# Patient Record
Sex: FEMALE | Race: WHITE | Employment: OTHER | URBAN - METROPOLITAN AREA
[De-identification: names, ages, dates, MRNs, and addresses within clinical notes are randomized per-mention and may not be internally consistent; named-entity substitution may affect disease eponyms.]

---

## 2018-04-04 ENCOUNTER — HOSPITAL ENCOUNTER (OUTPATIENT)
Dept: GENERAL RADIOLOGY | Age: 68
Discharge: HOME | End: 2018-04-06
Payer: MEDICARE

## 2018-04-04 ENCOUNTER — HOSPITAL ENCOUNTER (OUTPATIENT)
Age: 68
Discharge: HOME | End: 2018-04-06
Payer: MEDICARE

## 2018-04-04 DIAGNOSIS — C50.511 MALIGNANT NEOPLASM OF LOWER-OUTER QUADRANT OF RIGHT FEMALE BREAST, UNSPECIFIED ESTROGEN RECEPTOR STATUS (HCC): ICD-10-CM

## 2018-04-04 PROCEDURE — 73130 X-RAY EXAM OF HAND: CPT

## 2018-12-12 ENCOUNTER — HOSPITAL ENCOUNTER (OUTPATIENT)
Dept: RADIATION ONCOLOGY | Age: 68
Discharge: HOME | End: 2018-12-12
Payer: MEDICARE

## 2018-12-13 ENCOUNTER — HOSPITAL ENCOUNTER (OUTPATIENT)
Dept: RADIATION ONCOLOGY | Age: 68
Discharge: HOME | End: 2018-12-13
Payer: MEDICARE

## 2018-12-13 VITALS
HEART RATE: 87 BPM | DIASTOLIC BLOOD PRESSURE: 58 MMHG | OXYGEN SATURATION: 90 % | SYSTOLIC BLOOD PRESSURE: 92 MMHG | WEIGHT: 100.4 LBS

## 2018-12-13 DIAGNOSIS — C79.51 BONE METASTASIS (HCC): ICD-10-CM

## 2018-12-13 DIAGNOSIS — Z17.0 MALIGNANT NEOPLASM OF CENTRAL PORTION OF RIGHT BREAST IN FEMALE, ESTROGEN RECEPTOR POSITIVE (HCC): Primary | ICD-10-CM

## 2018-12-13 DIAGNOSIS — C50.111 MALIGNANT NEOPLASM OF CENTRAL PORTION OF RIGHT BREAST IN FEMALE, ESTROGEN RECEPTOR POSITIVE (HCC): Primary | ICD-10-CM

## 2018-12-13 PROCEDURE — 99205 OFFICE O/P NEW HI 60 MIN: CPT

## 2018-12-13 PROCEDURE — 99203 OFFICE O/P NEW LOW 30 MIN: CPT | Performed by: RADIOLOGY

## 2018-12-13 RX ORDER — ZOLEDRONIC ACID 4 MG/5ML
4 INJECTION INTRAVENOUS ONCE
COMMUNITY

## 2018-12-13 RX ORDER — HYDROCODONE BITARTRATE AND ACETAMINOPHEN 10; 325 MG/1; MG/1
1 TABLET ORAL EVERY 6 HOURS PRN
COMMUNITY
End: 2022-10-12

## 2018-12-13 ASSESSMENT — PAIN DESCRIPTION - LOCATION: LOCATION: BUTTOCKS;FOOT;LEG

## 2018-12-13 ASSESSMENT — PAIN DESCRIPTION - ORIENTATION: ORIENTATION: LEFT

## 2018-12-13 NOTE — PROGRESS NOTES
Oncology    Brigida:  [phone]              FAX: [address] [place] [address] Road:      [phone]             FAX:  [phone]      CC:  Joanie Najera MD , Vivek Washington MD  [address] [place] [address]

## 2018-12-13 NOTE — PROGRESS NOTES
Referring Physician:Dr Andres Ham MD :    Vitals:    12/13/18 1517   BP: (!) 92/58   Pulse: 87   SpO2: 90%    : Wt Readings from Last 1 Encounters:   12/13/18 100 lb 6.4 oz (45.5 kg)        There is no height or weight on file to calculate BMI. No chief complaint on file. Cancer Staging  No matching staging information was found for the patient. Prior Radiation Therapy? Yes   If yes, site treated:right breast            Facility:[facility]                        Date: 2/21/2015    Concurrent Chemo/radiation? Yes   If yes, start date: 2/21/2015    Prior Chemotherapy? Yes   If yes, site treated:Right Breast/Bone Mets  Facility[facility]                             Date:2/15/2015 -patient currently on Ibrance/Faslodex/Zometa  Prior Hormonal Therapy? No   If yes, site treated:   Facility:                             Date:    Head and Neck Cancer? No   If yes, please remind physician to place swallow study order and speech therapy order. Current Outpatient Prescriptions   Medication Sig Dispense Refill    HYDROcodone-acetaminophen (NORCO)  MG per tablet Take 1 tablet by mouth every 6 hours as needed for Pain. Ozzy Estrada Palbociclib (IBRANCE PO) Take by mouth      Fulvestrant (FASLODEX IM) Inject into the muscle      zoledronic acid (ZOMETA) 4 MG/5ML injection Infuse 4 mg intravenously once       No current facility-administered medications for this encounter. Past Medical History:   Diagnosis Date    Cancer [facility])        No past surgical history on file.     Family History   Problem Relation Age of Onset    Cancer Mother         breast    Cancer Father         lung    Cancer Maternal Aunt         breast    Cancer Maternal Grandmother        Social History     Social History    Marital status: Legally      Spouse name: N/A    Number of children: N/A    Years of education: N/A     Occupational History          bill's (Yellow sticker Level III) placed on patient chart       MALNUTRITION RISK SCREEN    Instructions:  Assess the patient and enter the appropriate indicators that are present for nutrition risk identification. Total the numbers entered and assign a risk score. Follow the appropriate action for total score listed below. Assessment   Date  12/13/2018     1. Have you lost weight without trying?                                   a. No (0)                       b. Unsure (2)                   0                                  c. Yes-  If yes, how much weight (kg) have                                       you lost?                                        a. 0.5-5.0(1)                                  b. >5.0-10.0 (2)                                  c. >10.0-15.0 (3)                                  D. >15.0-20.0 (4) 0        2. Have you been eating poorly because of a decreased appetite? No (0)                                        Yes (1)   0    3. Do you have a diagnosis of head and neck cancer? A. Yes (1)  B. No (0)  0   TOTAL 0      4. If score 0 or 1 not at risk of malnutrition                  >/=2 at risk of malnutrition, see below          Score of 0-1: No action  Score 2 or greater:  1. For Non-Diabetic Patient: Recommend adding Ensure Complete  2xdaily and provide              patient with Ensure wellness bag with coupons; For Diabetic Patient, Recommend adding Glucerna Shake 2xdaily and provide patient with Glucerna Wellness bag with coupons  2. Route to the dietitian via [facility]    · Are you having  difficulty performing daily routine tasks  due to fatigue or weakness (ie: bathing/showering, dressing, housework, meal prep, work, child Rocío March):  Yes     · Do you have any arm flexibility/ROM restrictions, swelling or pain that limit activity: No     · Any changes in memory, attention/focus that impact daily activities: No     · Do you

## 2018-12-18 PROCEDURE — 77290 THER RAD SIMULAJ FIELD CPLX: CPT

## 2018-12-18 PROCEDURE — 77334 RADIATION TREATMENT AID(S): CPT

## 2019-01-02 ENCOUNTER — HOSPITAL ENCOUNTER (OUTPATIENT)
Dept: RADIATION ONCOLOGY | Age: 69
Discharge: HOME | End: 2019-01-02
Payer: MEDICARE

## 2019-01-04 ENCOUNTER — HOSPITAL ENCOUNTER (OUTPATIENT)
Dept: RADIATION ONCOLOGY | Age: 69
Discharge: HOME | End: 2019-01-04
Payer: MEDICARE

## 2019-01-07 ENCOUNTER — TELEPHONE (OUTPATIENT)
Dept: RADIATION ONCOLOGY | Age: 69
End: 2019-01-07

## 2019-01-10 ENCOUNTER — HOSPITAL ENCOUNTER (OUTPATIENT)
Dept: RADIATION ONCOLOGY | Age: 69
Discharge: HOME | End: 2019-01-10
Payer: MEDICARE

## 2019-01-10 VITALS
RESPIRATION RATE: 18 BRPM | DIASTOLIC BLOOD PRESSURE: 61 MMHG | TEMPERATURE: 98.8 F | HEIGHT: 58 IN | BODY MASS INDEX: 20.74 KG/M2 | HEART RATE: 76 BPM | WEIGHT: 98.8 LBS | SYSTOLIC BLOOD PRESSURE: 116 MMHG

## 2019-01-10 DIAGNOSIS — Z17.0 MALIGNANT NEOPLASM OF CENTRAL PORTION OF RIGHT BREAST IN FEMALE, ESTROGEN RECEPTOR POSITIVE (HCC): Primary | ICD-10-CM

## 2019-01-10 DIAGNOSIS — C79.51 BONE METASTASIS (HCC): ICD-10-CM

## 2019-01-10 DIAGNOSIS — C50.111 MALIGNANT NEOPLASM OF CENTRAL PORTION OF RIGHT BREAST IN FEMALE, ESTROGEN RECEPTOR POSITIVE (HCC): Primary | ICD-10-CM

## 2019-01-10 PROCEDURE — 99999 PR OFFICE/OUTPT VISIT,PROCEDURE ONLY: CPT | Performed by: RADIOLOGY

## 2019-01-10 ASSESSMENT — PAIN DESCRIPTION - PAIN TYPE: TYPE: CHRONIC PAIN

## 2019-01-10 ASSESSMENT — PAIN DESCRIPTION - FREQUENCY: FREQUENCY: CONTINUOUS

## 2019-01-10 ASSESSMENT — PAIN DESCRIPTION - ORIENTATION: ORIENTATION: LEFT

## 2021-03-18 ENCOUNTER — HOSPITAL ENCOUNTER (OUTPATIENT)
Age: 71
Discharge: HOME | End: 2021-03-20

## 2021-03-18 PROCEDURE — 88305 TISSUE EXAM BY PATHOLOGIST: CPT

## 2021-03-18 PROCEDURE — 88360 TUMOR IMMUNOHISTOCHEM/MANUAL: CPT

## 2021-03-18 PROCEDURE — 88342 IMHCHEM/IMCYTCHM 1ST ANTB: CPT

## 2021-05-03 ENCOUNTER — OFFICE VISIT (OUTPATIENT)
Dept: ENT CLINIC | Age: 71
End: 2021-05-03
Payer: MEDICARE

## 2021-05-03 ENCOUNTER — PROCEDURE VISIT (OUTPATIENT)
Dept: AUDIOLOGY | Age: 71
End: 2021-05-03
Payer: MEDICARE

## 2021-05-03 VITALS
WEIGHT: 98 LBS | HEART RATE: 80 BPM | SYSTOLIC BLOOD PRESSURE: 139 MMHG | HEIGHT: 58 IN | DIASTOLIC BLOOD PRESSURE: 72 MMHG | BODY MASS INDEX: 20.57 KG/M2

## 2021-05-03 DIAGNOSIS — H69.80 DYSFUNCTION OF EUSTACHIAN TUBE, UNSPECIFIED LATERALITY: Primary | ICD-10-CM

## 2021-05-03 DIAGNOSIS — H90.2 CONDUCTIVE HEARING LOSS, UNSPECIFIED LATERALITY: ICD-10-CM

## 2021-05-03 DIAGNOSIS — H90.A11 CONDUCTIVE HEARING LOSS OF RIGHT EAR WITH RESTRICTED HEARING OF LEFT EAR: Primary | ICD-10-CM

## 2021-05-03 DIAGNOSIS — H61.23 BILATERAL IMPACTED CERUMEN: ICD-10-CM

## 2021-05-03 DIAGNOSIS — H69.81 DYSFUNCTION OF RIGHT EUSTACHIAN TUBE: ICD-10-CM

## 2021-05-03 DIAGNOSIS — H93.8X3 EAR FULLNESS, BILATERAL: ICD-10-CM

## 2021-05-03 DIAGNOSIS — H91.92 HIGH-FREQUENCY HEARING LOSS OF LEFT EAR: ICD-10-CM

## 2021-05-03 DIAGNOSIS — H61.23 BILATERAL HEARING LOSS DUE TO CERUMEN IMPACTION: ICD-10-CM

## 2021-05-03 PROCEDURE — 92567 TYMPANOMETRY: CPT | Performed by: AUDIOLOGIST

## 2021-05-03 PROCEDURE — 69210 REMOVE IMPACTED EAR WAX UNI: CPT | Performed by: OTOLARYNGOLOGY

## 2021-05-03 PROCEDURE — 99204 OFFICE O/P NEW MOD 45 MIN: CPT | Performed by: OTOLARYNGOLOGY

## 2021-05-03 PROCEDURE — 92557 COMPREHENSIVE HEARING TEST: CPT | Performed by: AUDIOLOGIST

## 2021-05-03 RX ORDER — MORPHINE SULFATE 15 MG/1
TABLET, FILM COATED, EXTENDED RELEASE ORAL
COMMUNITY
Start: 2021-04-28 | End: 2022-10-14

## 2021-05-03 RX ORDER — FLUTICASONE PROPIONATE 50 MCG
2 SPRAY, SUSPENSION (ML) NASAL DAILY
Qty: 1 BOTTLE | Refills: 3 | Status: SHIPPED
Start: 2021-05-03 | End: 2022-05-20

## 2021-05-03 SDOH — HEALTH STABILITY: MENTAL HEALTH: HOW OFTEN DO YOU HAVE A DRINK CONTAINING ALCOHOL?: NOT ASKED

## 2021-05-03 ASSESSMENT — ENCOUNTER SYMPTOMS
RESPIRATORY NEGATIVE: 1
EYES NEGATIVE: 1
ALLERGIC/IMMUNOLOGIC NEGATIVE: 1

## 2021-05-03 NOTE — PROGRESS NOTES
[facility]  Department of Otolaryngology  Office Consult Note  5/3/21          Subjective:        Chief Complaint:  had concerns including New Patient (BL cerumen ). Patient ID: Red Benjamin is a 69 y.o. female. HPI: Patient presents as  new patient for ear pain. Patient reports fullness of bilateral ears and decreased hearing over the past few years, worsening. Denies otorrhea. Has hx of seasonal allergies, rhinorrhea. Has intermittent goyo pitched tinnitus bilaterally, R >L, as well as intermittent dizziness/dysequilibrium lasting for 30mins or so then resolves. Has never had a hearing test before but has had previous cerumen removal by PCP due to small ear canals and cerumen impaction. Has used oil based ear drops with some improvement. Denies personal history of hearing loss, positive family history of father with hearing loss. Denies hx of ear surgery. Does has hx of Stage IV breast CA, treated with chemo and radiation. Review of Systems   Constitutional: Negative. Negative for chills and fever. HENT: Positive for ear pain and hearing loss. Negative for ear discharge, rhinorrhea, sinus pressure, sneezing and tinnitus. Eyes: Negative. Respiratory: Negative. Negative for cough and shortness of breath. Cardiovascular: Negative for chest pain and palpitations. Gastrointestinal: Negative for vomiting. Skin: Negative for rash. Allergic/Immunologic: Negative. Negative for environmental allergies. Neurological: Positive for dizziness. Negative for headaches. Hematological: Negative. Does not bruise/bleed easily. Psychiatric/Behavioral: Negative. All other systems reviewed and are negative. Past Medical History:   Diagnosis Date    Cancer [facility])      History reviewed. No pertinent surgical history. Current Outpatient Medications:     HYDROcodone-acetaminophen (NORCO)  MG per tablet, Take 1 tablet by mouth every 6 hours as needed for Pain. ., Disp: , Rfl:     Palbociclib Impaction    Pre-op: Cerumen Impaction    Post Op: Same    Procedure: Cerumen Impaction removal    Surgeon: Liang Mondragon    Procedure: Under microscopic assistance large cerumen impaction was removed using gentle suction and curette. TM intact B/L, Pt tolerated procedure well    Complications: None    Blood Loss Minimial        Assessment:      Bilateral hearing loss of the right mixed component  Bilateral cerumen impactions      Plan:          Patient seen and examined for history of bilateral ear fullness pressure and hearing loss status post removal of cerumen impaction as well as eustachian tube dysfunction.   Patient is placed on Flonase, follow-up in 6 months repeat examination as well as cerumen packs removal.  Full audiogram reviewed today with patient    Sartia Dorman DO  Resident Physician  [facility])  Otolaryngology Residency        5/3/2021  8:55 AM    Electronically signed by Ilene Hernandez DO on 5/3/21 at8:55 AM EDT

## 2021-05-03 NOTE — PROGRESS NOTES
This patient was referred for audiometric/tympanometric testing by Dr. Elias Sidhu due to hearing loss, bilaterally ear pain/pressure. Audiometry revealed hearing sensitivity within normal limits through 2000 Hz sloping to a slightly mild sensorineural hearing loss in the left ea. Right ear revealed hearing sensitivity within normal limits through 500 Hz sloping to a mild to moderate conductive hearing loss. Asymmetric hearing was noted and discussed with physician. Reliability was good. Speech reception thresholds were in good agreement with the pure tone averages, bilaterally. Speech discrimination scores were good, bilaterally. Tympanometry revealed normal middle ear peak pressure and compliance, in the left ear. Right ear revealed shallow compliance (0.14ml). The results were reviewed with the patient and physician. Recommendations for follow up will be made pending physician consult.     Noah Rader/CCC-A  [place] Lic # [id number]

## 2021-05-25 ASSESSMENT — ENCOUNTER SYMPTOMS
SHORTNESS OF BREATH: 0
SINUS PRESSURE: 0
VOMITING: 0
COUGH: 0
RHINORRHEA: 0

## 2022-02-21 ENCOUNTER — OFFICE VISIT (OUTPATIENT)
Dept: ENT CLINIC | Age: 72
End: 2022-02-21
Payer: MEDICARE

## 2022-02-21 VITALS — WEIGHT: 84.9 LBS | HEIGHT: 57 IN | BODY MASS INDEX: 18.32 KG/M2

## 2022-02-21 DIAGNOSIS — H61.23 BILATERAL IMPACTED CERUMEN: Primary | ICD-10-CM

## 2022-02-21 DIAGNOSIS — H69.80 DYSFUNCTION OF EUSTACHIAN TUBE, UNSPECIFIED LATERALITY: ICD-10-CM

## 2022-02-21 PROCEDURE — 69210 REMOVE IMPACTED EAR WAX UNI: CPT | Performed by: OTOLARYNGOLOGY

## 2022-02-21 PROCEDURE — 99213 OFFICE O/P EST LOW 20 MIN: CPT | Performed by: OTOLARYNGOLOGY

## 2022-02-21 ASSESSMENT — ENCOUNTER SYMPTOMS
FACIAL SWELLING: 0
SINUS PAIN: 0
ABDOMINAL PAIN: 0
SINUS PRESSURE: 0
NAUSEA: 0
COUGH: 0
VOMITING: 0
TROUBLE SWALLOWING: 0
RHINORRHEA: 0
VOICE CHANGE: 0
SHORTNESS OF BREATH: 0

## 2022-04-30 ENCOUNTER — HOSPITAL ENCOUNTER (OUTPATIENT)
Age: 72
Discharge: HOME | End: 2022-05-02
Payer: MEDICARE

## 2022-04-30 ENCOUNTER — HOSPITAL ENCOUNTER (OUTPATIENT)
Dept: GENERAL RADIOLOGY | Age: 72
Discharge: HOME | End: 2022-05-02
Payer: MEDICARE

## 2022-04-30 DIAGNOSIS — M89.8X5 PAIN IN FEMUR: ICD-10-CM

## 2022-04-30 PROCEDURE — 73552 X-RAY EXAM OF FEMUR 2/>: CPT

## 2022-05-13 ENCOUNTER — TELEPHONE (OUTPATIENT)
Dept: ORTHOPEDIC SURGERY | Age: 72
End: 2022-05-13

## 2022-05-13 NOTE — TELEPHONE ENCOUNTER
Call placed to patient in an attempt to schedule referral received. Lvm appointment tentatively made at this time.     Future Appointments   Date Time Provider Hali Zhang   5/20/2022  9:30 AM SCHEDULE, SE ORTHO RES SE [facility]   8/22/2022 10:30 [facility]

## 2022-05-16 DIAGNOSIS — R52 PAIN: Primary | ICD-10-CM

## 2022-05-20 ENCOUNTER — OFFICE VISIT (OUTPATIENT)
Dept: ORTHOPEDIC SURGERY | Age: 72
End: 2022-05-20
Payer: MEDICARE

## 2022-05-20 ENCOUNTER — HOSPITAL ENCOUNTER (OUTPATIENT)
Dept: GENERAL RADIOLOGY | Age: 72
Discharge: HOME | End: 2022-05-22
Payer: MEDICARE

## 2022-05-20 ENCOUNTER — PREP FOR PROCEDURE (OUTPATIENT)
Dept: ORTHOPEDIC SURGERY | Age: 72
End: 2022-05-20

## 2022-05-20 ENCOUNTER — TELEPHONE (OUTPATIENT)
Dept: ORTHOPEDIC SURGERY | Age: 72
End: 2022-05-20

## 2022-05-20 VITALS — WEIGHT: 85 LBS | BODY MASS INDEX: 18.34 KG/M2 | HEIGHT: 57 IN

## 2022-05-20 DIAGNOSIS — M54.50 LOW BACK PAIN, UNSPECIFIED BACK PAIN LATERALITY, UNSPECIFIED CHRONICITY, UNSPECIFIED WHETHER SCIATICA PRESENT: Primary | ICD-10-CM

## 2022-05-20 DIAGNOSIS — M54.50 LOW BACK PAIN, UNSPECIFIED BACK PAIN LATERALITY, UNSPECIFIED CHRONICITY, UNSPECIFIED WHETHER SCIATICA PRESENT: ICD-10-CM

## 2022-05-20 DIAGNOSIS — R52 PAIN: ICD-10-CM

## 2022-05-20 DIAGNOSIS — Z91.89 IMPENDING PATHOLOGIC FRACTURE: ICD-10-CM

## 2022-05-20 DIAGNOSIS — C50.919 METASTATIC BREAST CANCER (HCC): ICD-10-CM

## 2022-05-20 DIAGNOSIS — C50.919 METASTATIC BREAST CANCER (HCC): Primary | ICD-10-CM

## 2022-05-20 PROCEDURE — 99203 OFFICE O/P NEW LOW 30 MIN: CPT | Performed by: PHYSICIAN ASSISTANT

## 2022-05-20 PROCEDURE — 99203 OFFICE O/P NEW LOW 30 MIN: CPT

## 2022-05-20 PROCEDURE — 72100 X-RAY EXAM L-S SPINE 2/3 VWS: CPT

## 2022-05-20 PROCEDURE — 73502 X-RAY EXAM HIP UNI 2-3 VIEWS: CPT

## 2022-05-20 PROCEDURE — 99204 OFFICE O/P NEW MOD 45 MIN: CPT | Performed by: PHYSICIAN ASSISTANT

## 2022-05-20 RX ORDER — CAPECITABINE 500 MG/1
TABLET, FILM COATED ORAL 2 TIMES DAILY
COMMUNITY
Start: 2022-05-18

## 2022-05-20 RX ORDER — ONDANSETRON HYDROCHLORIDE 8 MG/1
TABLET, FILM COATED ORAL
COMMUNITY

## 2022-05-20 RX ORDER — SODIUM CHLORIDE 0.9 % (FLUSH) 0.9 %
5-40 SYRINGE (ML) INJECTION EVERY 12 HOURS SCHEDULED
Status: CANCELLED | OUTPATIENT
Start: 2022-05-20

## 2022-05-20 RX ORDER — SODIUM CHLORIDE 0.9 % (FLUSH) 0.9 %
5-40 SYRINGE (ML) INJECTION PRN
Status: CANCELLED | OUTPATIENT
Start: 2022-05-20

## 2022-05-20 RX ORDER — SODIUM CHLORIDE 9 MG/ML
INJECTION, SOLUTION INTRAVENOUS PRN
Status: CANCELLED | OUTPATIENT
Start: 2022-05-20

## 2022-05-20 NOTE — PATIENT INSTRUCTIONS
Please drop off disc with advanced imaging tests from [facility] early next week    1. Your surgery is scheduled for 6-1-22 at 7am  with Dr. Cristi Ladd DO at the main [id number]  [id number] on [facility] in [facility] . You will need to report to Preop area  that morning at 5am    2. You are having Observation surgery so you may be returning home the same day. Be prepared to stay in hospital 1-2 nights  3. Preadmission Testing (PAT) department at [facility] will contact you with all the details prior to surgery. 4. Nothing to eat or drink after midnight the night before surgery. You may take a pain pill and any other medicine PAT instructs you to take with small sip of water if needed. 5. Keep splint clean and dry. Do not remove or get wet. 6. Continue with ice and elevation to reduce swelling  7. Weight bearing as tolerated right lower and left lower extremity, use assistive devices  8. Take pain medicine as instructed  9. Call office with any question or concerns: [phone]. Hold ASA/LOVENOX the day of surgery. Hold all NSAIDs 7 days prior to surgery    All surgical patients will be gradually tapered off narcotic pain medication post operatively, this office will not continue narcotics longer than 6 weeks from date of surgery. If narcotics are required longer than this time period without objective finding you will be referred to pain management. Open Reduction With Internal Fixation for Limb Fracture: Before Your Surgery    What is open reduction with internal fixation? Open reduction with internal fixation is a type of surgery to fix a broken (fractured) bone. The doctor makes a cut, called an incision, in the skin over the bone. The doctor then moves the pieces of bone back into the normal position. This is called open reduction. The doctor may use special screws, pins, plates, or rods to hold the bone in place while it heals. This is called internal fixation.  These devices may stay in your body from now on. The doctor closes the incision with stitches. You will have a scar, but it will fade with time. You may spend from a few hours to a few days in the hospital. This depends on how serious your injury is. It usually takes 6 to 12 weeks for a broken bone to heal.    How soon you can go back to work and your normal routine depends on your job. It also depends on how long it takes your bone to heal. For example, if you have a broken leg and you sit at work, you may be able to go back in 1 to 2 weeks. But if your job requires you to walk or stand a lot, you may need to wait until your bone has healed.

## 2022-05-20 NOTE — PROGRESS NOTES
Patient here as a new patient today for left hip subscapsular fx. Patient states that since 2015, when she was diagnosed with Breast & Bone Cancer, is when the fx was initially found. Patient declined the surgery at the time with the fear of the cancer spreading. Patient states that she does feel pain in the left hip, she states that standing or walking for long period of time the pain increases to where she would need to sit down.            Electronically signed by Celeste Alanis MA on 5/20/2022 at 10:20 AM

## 2022-05-20 NOTE — PROGRESS NOTES
Orthopaedic H&P Note    Gricel Steiner is a 66 y.o. female, her YOB: 1950 with the following history as recorded in [facility]: There are no problems to display for this patient. Current Outpatient Medications   Medication Sig Dispense Refill    capecitabine (XELODA) 500 MG chemo tablet       ondansetron (ZOFRAN) 8 MG tablet ondansetron HCl 8 mg tablet   take 1 tablet by mouth every 8 hours if needed for nausea and vomiting      morphine (MS CONTIN) 15 MG extended release tablet take 1 tablet by mouth every 12 hours      HYDROcodone-acetaminophen (NORCO)  MG per tablet Take 1 tablet by mouth every 6 hours as needed for Pain. Elmon Plunk zoledronic acid (ZOMETA) 4 MG/5ML injection Infuse 4 mg intravenously once       No current facility-administered medications for this visit. Allergies: Sulfa antibiotics  Past Medical History:   Diagnosis Date    Cancer ([facility] [address]      No past surgical history on file. Family History   Problem Relation Age of Onset    Cancer Mother         breast    Cancer Father         lung    Cancer Maternal Aunt         breast    Cancer Maternal Grandmother      Social History     Tobacco Use    Smoking status: Former Smoker     Packs/day: 0.50     Quit date:      Years since quittin.3    Smokeless tobacco: Never Used    Tobacco comment: half pack a day for 54 yeqars   Substance Use Topics    Alcohol use: Yes     Comment: occasional                             Chief Complaint   Patient presents with    New Patient     left hip fx. SUBJECTIVE: Gricel Steiner is a 69-year-old female with history of metastatic breast cancer diagnosed in . She presents today with her daughter. She follows with oncology and is on chemo. She states that around the time she was diagnosed with cancer there was discussion regarding prophylactic hip surgery to prevent impending fracture.   She states that she declined surgery at that time because she was nervous that would cause the cancer to spread more. X-rays done 4/30/2022 demonstrated acute nondisplaced transverse subcapital left hip fracture. She states that she did have advanced imaging done recently at [facility] which showed diffuse widespread metastatic disease. She states that she is now considering prophylactic hip surgery as her pain is worsening. She states that she does have pain in bilateral hips as well as diffuse radiating leg pain worse in the left leg. She denies saddle anesthesias, bladder or bowel issues. She does have some pain in her low back as well. No other orthopedic complaints at this time. Review of Systems   Constitutional: Negative for fever, chills, diaphoresis, appetite change and fatigue. HENT: Negative for dental issues, hearing loss and tinnitus. Negative for congestion, sinus pressure, sneezing, sore throat. Negative for headache. Eyes: Negative for visual disturbance, blurred and double vision. Negative for pain, discharge, redness and itching  Respiratory: Negative for cough, shortness of breath and wheezing. Cardiovascular: Negative for chest pain, palpitations and leg swelling. No dyspnea on exertion   Gastrointestinal:   Negative for nausea, vomiting, abdominal pain, diarrhea, constipation  or black or bloody. Hematologic\Lymphatic:  negative for bleeding, petechiae,   Genitourinary: Negative for hematuria and difficulty urinating. Musculoskeletal: Negative for neck pain and stiffness. Mild for back pain, negative joint swelling and gait problem. Skin: Negative for pallor, rash and wound. Neurological: Negative for dizziness, tremors, seizures, weakness, light-headedness, no TIA or stroke symptoms. No numbness and headaches. Psychiatric/Behavioral: Negative.      Physical Examination:   General appearance: alert, and in no distress, cachectic  Mental status: alert, oriented to person, place, and time, normal mood, behavior, speech, dress, motor activity, and thought processes  Abdomen: soft, nondistended   Resp:   resp easy and unlabored, no audible wheezes note  Cardiac: distal pulses palpable, skin well perfused  Neurological: alert, oriented X3, normal speech, no focal findings or movement disorder noted, motor and sensory grossly normal bilaterally, normal muscle tone, no tremors, strength 5/5, normal gait and station  HEENT: normochephalic atraumatic, external ears and eyes normal, sclera normal, neck supple  Extremities:   peripheral pulses normal, no edema, redness or tenderness in the calves   Skin: normal coloration, no rashes or open wounds, no suspicious skin lesions noted  Psych: Affect euthymic   Musculoskeletal:    Extremity:  Bilateral Lower Extremity  Skin clean dry and intact, without signs of infection  no edema noted  Compartments supple throughout thigh and leg  Calf supple and nontender  Some weakness with hip flexion bilaterally  Demonstrates active knee flexion/extension, ankle plantar/dorsiflexion/great toe extension. States sensation intact to touch in sural/deep peroneal/superficial peroneal/saphenous/posterior tibial nerve distributions to foot/ankle. Palpable dorsalis pedis and posterior tibialis pulses, cap refill brisk in toes, foot warm/perfused. 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 Low back pain, unspecified back pain laterality, unspecified chronicity, unspecified whether sciatica present  XR LUMBAR SPINE (2-3 VIEWS)   2. Metastatic breast cancer ([facility] [address]     3.  Impending pathologic fracture       Discussion:  Had lengthy discussion with patient regarding Her diagnosis, typical prognosis, and expected outcomes. I reviewed the possible complications from the injury itself despite treatment choosen. I also discussed treatment options including nonoperative managements versus surgical management, along with risks and benefits of each. They have elected for operative management at this time. Discussed with the patient and her daughter that she does have widespread metastatic disease which she is aware of and some of the radiating lower extremity symptoms may be from her back. Discussed that recommendation is to go forward with prophylactic surgery to prevent possible complications from impending pathological femur fractures. They are in agreement with this plan. Discussed with patient factors that can impact patient's fracture healing. Patient has the following risk factors for union: cancer    Risk, benefits and treatment options discussed with Jose Victoria. she has verbalized understanding of options. The possibility of complications were also discussed to include but not limited to nerve damage, infection, problems with wound healing, vascular injury, chronic pain, stiffness, dysfunction, nonhealing of the bone, symptomatic hardware and/or its failure, need for subsequent surgery, dislocation, and blood clots as well as medical related problems and other problems not specifically discussed. Risk of anesthesia also discussed to include death. Post-op care, work, activity and restrictions which included the use of pain medication and possibility of using blood thinner post op were also discussed with Jose Victoria and she verbalized and agreed with the restrictions.      PLAN:  Plan for OR on 6-1-22 with Dr. Emily Khan, DO for bilateral femur prophylactic rodding  Pre-surgery medical clearance is not needed  NPO after midnight the night before surgery  WBAT bilaterally lower extremity  Hold NSAIDS 7 days prior to surgery  Hold aspirin the morning of your surgery  Patient seen and examined by  Aston Joshua  Discussed to have a family member drop off her [facility] [employer] [facility] for further review prior to surgery. Electronically signed by TIFFANY Ulloa on 5/20/2022 at 11:43 AM  Note: This report was completed using [employer] voiced recognition software. Every effort has been made to ensure accuracy; however, inadvertent computerized transcription errors may be present.

## 2022-05-20 NOTE — TELEPHONE ENCOUNTER
Prior Authorization Form:    DEMOGRAPHICS:                     Patient Name:  Rebekah Brunner  Patient :  1950            Insurance:  Payor: Sundar Robbins / Plan: [id number] [facility] HMO / Product Type: Medicare /   Insurance ID Number:    Payor/Plan Subscr  Sex Relation Sub. Ins. ID Effective Group Num   1. Sunadr Rosendo* Bartolome May  Female Self [id number]                                   [address]   2.  [place] -* FANTASMA MCCLAIN 1950 Female Self [id number]                                    [address]       [] Prior authorization obtained    DIAGNOSIS & PROCEDURE:                       Procedure/Operation: Bilateral  Femur Prophylactic Rodding         CPT Code: 33547 x 2    Diagnosis:  Impending Pathological Left Hip Fracture due to Neoplasm Disease, Impending Pathological Right Hip Fracture due to Neoplasm Disease, Left Femur Pain, Right Femur Pain    ICD10 Code: M84.552A, M84.551A, M79.652, M79.651, Z91.89    Location:  [facility]    Surgeon:  Dr. Denis Sunshine INFORMATION:                          Date: 2022   Time: 8:30 am             Anesthesia:  General                                                       Status:  Inpatient      Special Comments:        Vendor: [employer]   [x]   Notified     Length of Surgery (with 30 min clean up time): 1.5 hours requested     Medical clearance: not required    Pre-Op Labs:       []  Orders Placed    Electronically signed by Trevon Barrera MA on 2022 at 1:06 PM

## 2022-05-26 ENCOUNTER — TELEPHONE (OUTPATIENT)
Dept: ORTHOPEDIC SURGERY | Age: 72
End: 2022-05-26

## 2022-05-26 DIAGNOSIS — C50.919 METASTATIC BREAST CANCER (HCC): ICD-10-CM

## 2022-05-26 DIAGNOSIS — Z91.89 IMPENDING PATHOLOGIC FRACTURE: Primary | ICD-10-CM

## 2022-05-26 NOTE — TELEPHONE ENCOUNTER
Per Dr. Jay Condon, bilateral Femur CT scans ordered and pended for signature. Per Dr. Jay Condon, surgery for 6/1/2022 to be cancelled and CT scans to be completed and reviewed in office to determine next steps.

## 2022-05-27 ENCOUNTER — TELEPHONE (OUTPATIENT)
Dept: ORTHOPEDIC SURGERY | Age: 72
End: 2022-05-27

## 2022-05-27 NOTE — TELEPHONE ENCOUNTER
Call placed to patient's daughter, Trinity Cagle and informed her that the CT scans of bilateral femurs were ordered. Provided CT scheduling phone number and instructed patient and daughter to call and schedule the imaging and then call our office to schedule follow up after the CTs are completed. Patient's daughter verbalized understanding of this.     Future Appointments   Date Time Provider Hlai Zhang   8/22/2022 10:30 AM Cristi Vazquez  [address] [place] [address]

## 2022-05-27 NOTE — TELEPHONE ENCOUNTER
Spoke with [facility] Prior Authorization Department this morning. Spoke with Rubi Noel. Today's call reference # [id number]    Bilateral Femur CT Scan does NOT require prior authorization.    CPT 99946 (x 2)  DX: Impending Pathological Fracture Z91.89, Metastatic Breast Cancer C50.919

## 2022-05-27 NOTE — PROGRESS NOTES
[address]   PRE-ADMISSION TESTING GENERAL INSTRUCTIONS  [facility] Phone Number: [phone]      GENERAL INSTRUCTIONS:    [x] Antibacterial Soap Shower Night before and/or AM of Surgery   [x] Do not wear makeup, lotions, powders, deodorant. [x] Nothing by mouth after midnight, including gum, candy, mints, or water. [x] You may brush your teeth, gargle, but do NOT swallow water. [x] No tobacco products, illegal drugs, or alcohol within 24 hours of your surgery. [x] Jewelry or valuables should not be brought to the hospital. All body and/or tongue piercing's must be removed prior to arriving to hospital. ALL hair pins must be removed. [x] Bring insurance card and photo ID. [x] Transfusion Bracelet: Please bring with you to hospital, day of surgery     PARKING INSTRUCTIONS:     [x] ARRIVAL TIME: 4702 on 6/6/22  · [x] Enter into the [employer]. Two people may accompany you. Masks are required. · [x] Parking Lot \"I\" is where you will park. It is located on the corner of [address] and [address]. The entrance is on [address]. · To enter, press the button and the gate will lift. A free token will be provided to exit the lot. EDUCATION INSTRUCTIONS:        [x] Pre-admission Testing educational folder given  [x] Incentive Spirometry,coughing & deep breathing exercises reviewed. pamphlet placed in chart. [x] Pain: Post-op pain is normal and to be expected. You will be asked to rate your pain from 0-10. [x] Ask your nurse for your pain medication. MEDICATION INSTRUCTIONS:    [x] Bring a complete list of your medications, please write the last time you took the medicine, give this list to the nurse. [x] Take the following medications the morning of surgery with 1-2 ounces of water: MS Contin  [x] Stop herbal supplements and vitamins 5 days before your surgery. [x] Follow physician instructions regarding any blood thinners you may be taking.     WHAT TO EXPECT:    [x] The day of surgery you will be greeted and checked in by the [employer].  In addition, you will be registered in the [facility] by a Patient Access Representative. Please bring your photo ID and insurance card. A nurse will greet you in accordance to the time you are needed in the pre-op area to prepare you for surgery. Please do not be discouraged if you are not greeted in the order you arrive as there are many variables that are involved in patient preparation. Your patience is greatly appreciated as you wait for your nurse. Please bring in items such as: books, magazines, newspapers, electronics, or any other items  to occupy your time in the waiting area. [x]  Delays may occur with surgery and staff will make a sincere effort to keep you informed of delays. If any delays occur with your procedure, we apologize ahead of time for your inconvenience as we recognize the value of your time.

## 2022-05-27 NOTE — TELEPHONE ENCOUNTER
Contacted patient. Provided phone number to call to schedule Bilateral Femur CT Scan appointment. Patient advised me to speak with her daughter, Eva Bone. Daughter provides transportation to appointments. Advised to speak with daughter regarding scheduling CT Scan appointments. Informed patient her surgery date may need to be moved from 6-1-2022 if she does not have the CT Scans done in time. Tried to reach daughter, no answer. Left voicemail to return my call.

## 2022-06-02 ENCOUNTER — HOSPITAL ENCOUNTER (OUTPATIENT)
Dept: PREADMISSION TESTING | Age: 72
Discharge: HOME | End: 2022-06-02
Payer: MEDICARE

## 2022-06-02 VITALS
HEIGHT: 57 IN | OXYGEN SATURATION: 97 % | WEIGHT: 85 LBS | RESPIRATION RATE: 16 BRPM | SYSTOLIC BLOOD PRESSURE: 97 MMHG | BODY MASS INDEX: 18.34 KG/M2 | DIASTOLIC BLOOD PRESSURE: 49 MMHG | TEMPERATURE: 98.4 F | HEART RATE: 94 BPM

## 2022-06-02 DIAGNOSIS — C50.919 METASTATIC BREAST CANCER (HCC): ICD-10-CM

## 2022-06-02 DIAGNOSIS — Z91.89 IMPENDING PATHOLOGIC FRACTURE: ICD-10-CM

## 2022-06-02 DIAGNOSIS — Z01.810 PRE-OPERATIVE CARDIOVASCULAR EXAMINATION: ICD-10-CM

## 2022-06-02 DIAGNOSIS — Z01.812 PRE-OPERATIVE LABORATORY EXAMINATION: Primary | ICD-10-CM

## 2022-06-02 LAB
ABO/RH: NORMAL
ALBUMIN SERPL-MCNC: 4.1 G/DL (ref 3.5–5.2)
ALP BLD-CCNC: 129 U/L (ref 35–104)
ALT SERPL-CCNC: 10 U/L (ref 0–32)
ANION GAP SERPL CALCULATED.3IONS-SCNC: 14 MMOL/L (ref 7–16)
ANTIBODY SCREEN: NORMAL
AST SERPL-CCNC: 23 U/L (ref 0–31)
BASOPHILS ABSOLUTE: 0.02 E9/L (ref 0–0.2)
BASOPHILS RELATIVE PERCENT: 0.3 % (ref 0–2)
BILIRUB SERPL-MCNC: 0.3 MG/DL (ref 0–1.2)
BUN BLDV-MCNC: 26 MG/DL (ref 6–23)
CALCIUM SERPL-MCNC: 9.3 MG/DL (ref 8.6–10.2)
CHLORIDE BLD-SCNC: 101 MMOL/L (ref 98–107)
CO2: 23 MMOL/L (ref 22–29)
CREAT SERPL-MCNC: 0.9 MG/DL (ref 0.5–1)
EKG ATRIAL RATE: 84 BPM
EKG P AXIS: 67 DEGREES
EKG P-R INTERVAL: 118 MS
EKG Q-T INTERVAL: 348 MS
EKG QRS DURATION: 70 MS
EKG QTC CALCULATION (BAZETT): 411 MS
EKG R AXIS: -2 DEGREES
EKG T AXIS: 38 DEGREES
EKG VENTRICULAR RATE: 84 BPM
EOSINOPHILS ABSOLUTE: 0.11 E9/L (ref 0.05–0.5)
EOSINOPHILS RELATIVE PERCENT: 1.9 % (ref 0–6)
GFR AFRICAN AMERICAN: >60
GFR NON-AFRICAN AMERICAN: >60 ML/MIN/1.73
GLUCOSE BLD-MCNC: 108 MG/DL (ref 74–99)
HCT VFR BLD CALC: 28.6 % (ref 34–48)
HEMOGLOBIN: 8.7 G/DL (ref 11.5–15.5)
IMMATURE GRANULOCYTES #: 0.03 E9/L
IMMATURE GRANULOCYTES %: 0.5 % (ref 0–5)
INR BLD: 1.1
LYMPHOCYTES ABSOLUTE: 0.68 E9/L (ref 1.5–4)
LYMPHOCYTES RELATIVE PERCENT: 11.9 % (ref 20–42)
MCH RBC QN AUTO: 30.2 PG (ref 26–35)
MCHC RBC AUTO-ENTMCNC: 30.4 % (ref 32–34.5)
MCV RBC AUTO: 99.3 FL (ref 80–99.9)
MONOCYTES ABSOLUTE: 0.62 E9/L (ref 0.1–0.95)
MONOCYTES RELATIVE PERCENT: 10.8 % (ref 2–12)
NEUTROPHILS ABSOLUTE: 4.27 E9/L (ref 1.8–7.3)
NEUTROPHILS RELATIVE PERCENT: 74.6 % (ref 43–80)
PDW BLD-RTO: 18.8 FL (ref 11.5–15)
PLATELET # BLD: 232 E9/L (ref 130–450)
PMV BLD AUTO: 9 FL (ref 7–12)
POTASSIUM SERPL-SCNC: 4.2 MMOL/L (ref 3.5–5)
PROTHROMBIN TIME: 11.9 SEC (ref 9.3–12.4)
RBC # BLD: 2.88 E12/L (ref 3.5–5.5)
SODIUM BLD-SCNC: 138 MMOL/L (ref 132–146)
TOTAL PROTEIN: 6.5 G/DL (ref 6.4–8.3)
WBC # BLD: 5.7 E9/L (ref 4.5–11.5)

## 2022-06-02 PROCEDURE — 93005 ELECTROCARDIOGRAM TRACING: CPT | Performed by: PHYSICIAN ASSISTANT

## 2022-06-02 PROCEDURE — 80053 COMPREHEN METABOLIC PANEL: CPT

## 2022-06-02 PROCEDURE — 86900 BLOOD TYPING SEROLOGIC ABO: CPT

## 2022-06-02 PROCEDURE — 36415 COLL VENOUS BLD VENIPUNCTURE: CPT

## 2022-06-02 PROCEDURE — 86901 BLOOD TYPING SEROLOGIC RH(D): CPT

## 2022-06-02 PROCEDURE — 86850 RBC ANTIBODY SCREEN: CPT

## 2022-06-02 PROCEDURE — 85025 COMPLETE CBC W/AUTO DIFF WBC: CPT

## 2022-06-02 PROCEDURE — 87081 CULTURE SCREEN ONLY: CPT

## 2022-06-02 PROCEDURE — 85610 PROTHROMBIN TIME: CPT

## 2022-06-03 LAB — MRSA CULTURE ONLY: NORMAL

## 2022-06-03 NOTE — TELEPHONE ENCOUNTER
- CT Scans are scheduled for 6-6-2022. - Appointment with Dr. Gordo Blount to discuss CT results scheduled for 6-9-2022. - Surgery has been r/s to 6- for Both femurs. Please note:  Surgery has been rescheduled 2 times. Insurance has approved new surgery date of 6- under the current authorization on file. If surgery date changes again, a brand NEW prior authorization will need to be started which will delay having surgery done.

## 2022-06-03 NOTE — TELEPHONE ENCOUNTER
Updated using new surgery date of 6- for bilateral femurs. Diagnosis has been updated as well for bilateral femurs.

## 2022-06-06 ENCOUNTER — HOSPITAL ENCOUNTER (OUTPATIENT)
Dept: CT IMAGING | Age: 72
Discharge: HOME | End: 2022-06-08
Payer: MEDICARE

## 2022-06-06 DIAGNOSIS — C50.919 METASTATIC BREAST CANCER (HCC): ICD-10-CM

## 2022-06-06 DIAGNOSIS — Z91.89 IMPENDING PATHOLOGIC FRACTURE: ICD-10-CM

## 2022-06-06 PROCEDURE — 73700 CT LOWER EXTREMITY W/O DYE: CPT

## 2022-06-09 ENCOUNTER — OFFICE VISIT (OUTPATIENT)
Dept: ORTHOPEDIC SURGERY | Age: 72
End: 2022-06-09
Payer: MEDICARE

## 2022-06-09 VITALS — HEIGHT: 57 IN | WEIGHT: 83 LBS | BODY MASS INDEX: 17.91 KG/M2

## 2022-06-09 DIAGNOSIS — C79.51 METASTATIC CANCER TO BONE (HCC): ICD-10-CM

## 2022-06-09 PROCEDURE — 99213 OFFICE O/P EST LOW 20 MIN: CPT | Performed by: ORTHOPAEDIC SURGERY

## 2022-06-09 PROCEDURE — 1123F ACP DISCUSS/DSCN MKR DOCD: CPT | Performed by: ORTHOPAEDIC SURGERY

## 2022-06-09 PROCEDURE — 99212 OFFICE O/P EST SF 10 MIN: CPT

## 2022-06-09 NOTE — PROGRESS NOTES
Orthopaedic Clinic Note    Jose Victoria is a 66 y.o. female, her YOB: 1950 with the following history as recorded in [facility]:      Patient Active Problem List    Diagnosis Date Noted    Metastatic cancer to bone ([facility] [address] 2022     Current Outpatient Medications   Medication Sig Dispense Refill    capecitabine (XELODA) 500 MG chemo tablet 2 times daily       ondansetron (ZOFRAN) 8 MG tablet ondansetron HCl 8 mg tablet   take 1 tablet by mouth every 8 hours if needed for nausea and vomiting      morphine (MS CONTIN) 15 MG extended release tablet take 1 tablet by mouth every 12 hours      HYDROcodone-acetaminophen (NORCO)  MG per tablet Take 1 tablet by mouth every 6 hours as needed for Pain. Norm Ayala zoledronic acid (ZOMETA) 4 MG/5ML injection Infuse 4 mg intravenously once Every 4 weeks       No current facility-administered medications for this visit. Allergies: Sulfa antibiotics  Past Medical History:   Diagnosis Date    Cancer [facility])      History reviewed. No pertinent surgical history. Family History   Problem Relation Age of Onset    Cancer Mother         breast    Cancer Father         lung    Cancer Maternal Aunt         breast    Cancer Maternal Grandmother      Social History     Tobacco Use    Smoking status: Former Smoker     Packs/day: 0.50     Quit date:      Years since quittin.4    Smokeless tobacco: Never Used    Tobacco comment: half pack a day for 54 yeqars   Substance Use Topics    Alcohol use: Yes     Comment: occasional                             Chief Complaint   Patient presents with    Results     CT scan results bilateral femurs. SUBJECTIVE: Jose Victoria is a 65-year-old female with history of metastatic breast cancer diagnosed in 2015. She presents today with her daughter. She follows with oncology and is on chemo.   Patient was previously seen in office and due to her significant bone involvement there was discussion on possible prophylactic fixation of her femurs. Patient had outside imaging which is old therefore we obtained new CT scans to better evaluate her overall risk at this point. Denies any interval changes, states she has some mild pain to the lower right thigh but otherwise is ambulating without assistive device. She denies saddle anesthesias, bladder or bowel issues. She does have some pain in her low back as well. No other orthopedic complaints at this time. Review of Systems   Constitutional: Negative for fever, chills, diaphoresis, appetite change and fatigue. HENT: Negative for dental issues, hearing loss and tinnitus. Negative for congestion, sinus pressure, sneezing, sore throat. Negative for headache. Eyes: Negative for visual disturbance, blurred and double vision. Negative for pain, discharge, redness and itching  Respiratory: Negative for cough, shortness of breath and wheezing. Cardiovascular: Negative for chest pain, palpitations and leg swelling. No dyspnea on exertion   Gastrointestinal:   Negative for nausea, vomiting, abdominal pain, diarrhea, constipation  or black or bloody. Hematologic\Lymphatic:  negative for bleeding, petechiae,   Genitourinary: Negative for hematuria and difficulty urinating. Musculoskeletal: Negative for neck pain and stiffness. Mild for back pain, negative joint swelling and gait problem. Skin: Negative for pallor, rash and wound. Neurological: Negative for dizziness, tremors, seizures, weakness, light-headedness, no TIA or stroke symptoms. No numbness and headaches. Psychiatric/Behavioral: Negative.      Physical Examination:   General appearance: alert, and in no distress, cachectic  Mental status: alert, oriented to person, place, and time, normal mood, behavior, speech, dress, motor activity, and thought processes  Abdomen: soft, nondistended   Resp:   resp easy and unlabored, no audible wheezes note  Cardiac: distal pulses palpable, skin well perfused  Neurological: alert, oriented X3, normal speech, no focal findings or movement disorder noted, motor and sensory grossly normal bilaterally, normal muscle tone, no tremors, strength 5/5, normal gait and station  HEENT: normochephalic atraumatic, external ears and eyes normal, sclera normal, neck supple  Extremities:   peripheral pulses normal, no edema, redness or tenderness in the calves   Skin: normal coloration, no rashes or open wounds, no suspicious skin lesions noted  Psych: Affect euthymic   Musculoskeletal:    Extremity:  Bilateral Lower Extremity  Skin clean dry and intact, without signs of infection  no edema noted  Compartments supple throughout thigh and leg  Calf supple and nontender  Some weakness with hip flexion bilaterally  Demonstrates active knee flexion/extension, ankle plantar/dorsiflexion/great toe extension. States sensation intact to touch in sural/deep peroneal/superficial peroneal/saphenous/posterior tibial nerve distributions to foot/ankle. Palpable dorsalis pedis and posterior tibialis pulses, cap refill brisk in toes, foot warm/perfused. Ht 4' 9\" (1.448 m)   Wt 83 lb (37.6 kg)   BMI 17.96 kg/m²      CT:  Narrative   EXAMINATION:   CT OF THE RIGHT FEMUR WITH CONTRAST; CT OF THE LEFT FEMUR WITHOUT CONTRAST   6/6/2022 3:05 pm       TECHNIQUE:   CT of the right femur was performed with the administration of intravenous   contrast.  Multiplanar reformatted images are provided for review. Automated   exposure control, iterative reconstruction, and/or weight based adjustment of   the mA/kV was utilized to reduce the radiation dose to as low as reasonably   achievable.; CT of the left femur was performed without the administration of   intravenous contrast.  Multiplanar reformatted images are provided for   review.  Automated exposure control, iterative reconstruction, and/or weight   based adjustment of the mA/kV was utilized to reduce the radiation dose to as   low as reasonably achievable.       COMPARISON:   Right and left femur x-ray exams 04/30/2022       HISTORY   ORDERING SYSTEM PROVIDED HISTORY: Impending pathologic fracture       Technologist provided history: History of breast cancer metastatic to bone   and prior radiation therapy.       FINDINGS:   RIGHT FEMUR: No fracture or dislocation.  Widespread mixed sclerotic and   osteolytic pattern of the lower right hemipelvis including the sacrum,   acetabulum, femoral head, and proximal right femur.  Localized sclerotic   metastatic bone disease of the distal right femoral metaphysis and epiphysis. The right femoral diaphysis shows no dominant lesion.  No extraosseous or   pericortical tumor extension is seen.  No soft tissue hematoma.       LEFT FEMUR: Healing, subcapital fracture of the left femoral neck.  Prior   radiation therapy on the left and a healing insufficiency type fracture would   be included in differential.  Mixed sclerotic and osteolytic bony lesions of   the sacrum and left hemipelvis.  Some motion with associated CT   reconstruction artifact at the mid left femur.  Separate from the left   femoral neck, no distal femoral fracture is seen.  The distal left femur   shows no metastatic disease as seen on the right side.  No extraosseous or   pericortical tumor extension is seen.  No soft tissue hematoma.           Impression   1.  Widespread metastatic bone disease of the pelvis and right femur.       2.  No fracture of the right femur.       3.  Subcapital, healing fracture of the left femoral neck.  Details above. Intact distal left femur.             ASSESSMENT:   Diagnosis Orders   1. Metastatic cancer to bone [facility])       PLAN:  Reviewed CT imaging today with patient in office, discussed there is no significant erosive lesion and based on Mirel's criteria she does not meet the indication for prophylactic fixation at this time.   Recommend continued observation, patient follow-up in office in 3-4 months for repeat evaluation with repeat imaging, discussed she can call the office if any interval questions or concerns or if you were to develop any increasing pain about her thighs or hips. Electronically signed by Beck Velazquez DO on 6/9/2022     Note: This report was completed using [employer] voiced recognition software. Every effort has been made to ensure accuracy; however, inadvertent computerized transcription errors may be present.

## 2022-08-22 ENCOUNTER — PROCEDURE VISIT (OUTPATIENT)
Dept: AUDIOLOGY | Age: 72
End: 2022-08-22
Payer: MEDICARE

## 2022-08-22 ENCOUNTER — OFFICE VISIT (OUTPATIENT)
Dept: ENT CLINIC | Age: 72
End: 2022-08-22
Payer: MEDICARE

## 2022-08-22 VITALS
BODY MASS INDEX: 17.69 KG/M2 | DIASTOLIC BLOOD PRESSURE: 65 MMHG | HEART RATE: 102 BPM | WEIGHT: 82 LBS | HEIGHT: 57 IN | SYSTOLIC BLOOD PRESSURE: 93 MMHG

## 2022-08-22 DIAGNOSIS — H65.499 CHRONIC OTITIS MEDIA WITH EFFUSION, UNSPECIFIED LATERALITY: Primary | ICD-10-CM

## 2022-08-22 DIAGNOSIS — Z86.69 MIDDLE EAR INFECTION RESOLVED: Primary | ICD-10-CM

## 2022-08-22 DIAGNOSIS — H61.23 BILATERAL HEARING LOSS DUE TO CERUMEN IMPACTION: ICD-10-CM

## 2022-08-22 PROCEDURE — 1123F ACP DISCUSS/DSCN MKR DOCD: CPT | Performed by: OTOLARYNGOLOGY

## 2022-08-22 PROCEDURE — 99213 OFFICE O/P EST LOW 20 MIN: CPT | Performed by: OTOLARYNGOLOGY

## 2022-08-22 PROCEDURE — 69210 REMOVE IMPACTED EAR WAX UNI: CPT | Performed by: OTOLARYNGOLOGY

## 2022-08-22 PROCEDURE — 92552 PURE TONE AUDIOMETRY AIR: CPT | Performed by: AUDIOLOGIST

## 2022-08-22 NOTE — PROGRESS NOTES
[id number] [facility] Otolaryngology  Dr. Brigette Mancia. CLAUDIO Coyle Ms.Ed. New Consult       Patient Name:  Malena Bush  :  1950     CHIEF C/O:    Chief Complaint   Patient presents with    Follow-up     6 mo w/audio, B/L ear fullness, daughter cleaned w/ debrox and tweezers yesterday,        HISTORY OBTAINED FROM:  patient    HISTORY OF PRESENT ILLNESS:       Avni Bolaños is a 66y.o. year old female, here today for evaluation of possible unilateral hearing loss, patient went a full audiogram by audiology was found to have unilateral component to it was referred for possible reevaluation. No complaints of ear pain or drainage, does note some ear fullness  Right greater than left. Continue current no medications of change, no ear drainage no ear pain no history of ear infection no history of ear surgeries in the past.  No other complaints today epistaxis nasal congestion difficulty swallowing fever chills shortness of breath or stridor. Past Medical History:   Diagnosis Date    Cancer [facility])      History reviewed. No pertinent surgical history. Current Outpatient Medications:     capecitabine (XELODA) 500 MG chemo tablet, 2 times daily , Disp: , Rfl:     ondansetron (ZOFRAN) 8 MG tablet, ondansetron HCl 8 mg tablet  take 1 tablet by mouth every 8 hours if needed for nausea and vomiting, Disp: , Rfl:     morphine (MS CONTIN) 15 MG extended release tablet, take 1 tablet by mouth every 12 hours, Disp: , Rfl:     HYDROcodone-acetaminophen (NORCO)  MG per tablet, Take 1 tablet by mouth every 6 hours as needed for Pain. ., Disp: , Rfl:     zoledronic acid (ZOMETA) 4 MG/5ML injection, Infuse 4 mg intravenously once Every 4 weeks, Disp: , Rfl:   Sulfa antibiotics  Social History     Tobacco Use    Smoking status: Former     Packs/day: 0.50     Types: Cigarettes     Quit date:      Years since quittin.6    Smokeless tobacco: Never    Tobacco comments:     half pack a day for 55 yeqars   Substance Use Topics    Alcohol use: Yes     Comment: occasional    Drug use: No     Family History   Problem Relation Age of Onset    Cancer Mother         breast    Cancer Father         lung    Cancer Maternal Aunt         breast    Cancer Maternal Grandmother        Review of Systems   Constitutional:  Negative for chills and fever. HENT:  Negative for ear discharge and hearing loss. Respiratory:  Negative for cough and shortness of breath. Cardiovascular:  Negative for chest pain and palpitations. Gastrointestinal:  Negative for vomiting. Skin:  Negative for rash. Allergic/Immunologic: Negative for environmental allergies. Neurological:  Negative for dizziness and headaches. Hematological:  Does not bruise/bleed easily. All other systems reviewed and are negative. BP 93/65   Pulse (!) 102   Ht 4' 9\" (1.448 m)   Wt 82 lb (37.2 kg)   BMI 17.74 kg/m²   Physical Exam  Vitals and nursing note reviewed. Constitutional:       Appearance: She is well-developed. HENT:      Head: Normocephalic and atraumatic. Right Ear: Tympanic membrane and ear canal normal. There is impacted cerumen. Left Ear: Tympanic membrane and ear canal normal. There is no impacted cerumen. Nose: No congestion or rhinorrhea. Mouth/Throat:      Mouth: Mucous membranes are dry. Eyes:      Pupils: Pupils are equal, round, and reactive to light. Neck:      Thyroid: No thyromegaly. Trachea: No tracheal deviation. Cardiovascular:      Rate and Rhythm: Normal rate. Pulmonary:      Effort: Pulmonary effort is normal. No respiratory distress. Musculoskeletal:         General: Normal range of motion. Cervical back: Normal range of motion. Lymphadenopathy:      Cervical: No cervical adenopathy. Skin:     General: Skin is warm. Findings: No erythema. Neurological:      Mental Status: She is alert. Cranial Nerves: No cranial nerve deficit.                    IMPRESSION/PLAN:  Patient seen exam for history of possible asymmetric hearing loss, underwent full cerumen impaction removal today with repeat audiogram indicating normal hearing in both ears. With no asymmetry of appropriate. Dr. Alesha Villalta Otolaryngology/Facial Plastic Surgery Residency  Associate Clinical Professor:  Renetta Lee, [facility]

## 2022-08-23 NOTE — PROGRESS NOTES
This patient was referred for audiometric/tympanometric testing by Dr. Catracho Britton. Patient is being seen for a 6-month ENT/Audio, with an improvement in her symptoms and hearing sensitivity, right ear. Pure tone air conduction audiometry revealed a normal hearing sensitivity, through the frequency range, bilaterally. Reliability was good. Tympanometry revealed normal middle ear peak pressure and compliance, bilaterally. The results were reviewed with the patient. Recommendations for follow up will be made pending physician consult.     Tori Lentz CCC/MEERA  Audiologist  [id number]  NPI#:  [id number]

## 2022-09-20 ASSESSMENT — ENCOUNTER SYMPTOMS
COUGH: 0
SHORTNESS OF BREATH: 0
VOMITING: 0

## 2022-10-12 DIAGNOSIS — R09.89 AIR HUNGER: ICD-10-CM

## 2022-10-12 DIAGNOSIS — F41.9 ANXIETY: ICD-10-CM

## 2022-10-12 DIAGNOSIS — R52 PAIN: ICD-10-CM

## 2022-10-12 DIAGNOSIS — Z51.5 HOSPICE CARE PATIENT: Primary | ICD-10-CM

## 2022-10-12 RX ORDER — MORPHINE SULFATE 100 MG/5ML
10 SOLUTION ORAL
Qty: 30 ML | Refills: 0 | Status: SHIPPED | OUTPATIENT
Start: 2022-10-12 | End: 2022-10-14 | Stop reason: SDUPTHER

## 2022-10-12 RX ORDER — LORAZEPAM 0.5 MG/1
0.5 TABLET ORAL EVERY 4 HOURS PRN
Qty: 30 TABLET | Refills: 3 | Status: SHIPPED | OUTPATIENT
Start: 2022-10-12 | End: 2022-10-14 | Stop reason: SINTOL

## 2022-10-14 DIAGNOSIS — G89.3 NEOPLASM RELATED PAIN: ICD-10-CM

## 2022-10-14 DIAGNOSIS — Z51.5 HOSPICE CARE PATIENT: Primary | ICD-10-CM

## 2022-10-14 DIAGNOSIS — R09.89 AIR HUNGER: ICD-10-CM

## 2022-10-14 DIAGNOSIS — R52 PAIN: ICD-10-CM

## 2022-10-14 RX ORDER — MORPHINE SULFATE 100 MG/5ML
15 SOLUTION ORAL
Qty: 30 ML | Refills: 0 | Status: SHIPPED | OUTPATIENT
Start: 2022-10-14 | End: 2022-10-20 | Stop reason: SDUPTHER

## 2022-10-14 RX ORDER — METHADONE HYDROCHLORIDE 5 MG/1
5 TABLET ORAL 2 TIMES DAILY
Qty: 60 TABLET | Refills: 0 | Status: SHIPPED | OUTPATIENT
Start: 2022-10-14 | End: 2022-11-13

## 2022-10-20 DIAGNOSIS — Z51.5 HOSPICE CARE PATIENT: ICD-10-CM

## 2022-10-20 DIAGNOSIS — R52 PAIN: ICD-10-CM

## 2022-10-20 DIAGNOSIS — R09.89 AIR HUNGER: ICD-10-CM

## 2022-10-20 RX ORDER — MORPHINE SULFATE 100 MG/5ML
15 SOLUTION ORAL
Qty: 30 ML | Refills: 0 | Status: SHIPPED | OUTPATIENT
Start: 2022-10-20 | End: 2022-10-31 | Stop reason: SDUPTHER

## 2022-10-31 DIAGNOSIS — Z51.5 HOSPICE CARE PATIENT: ICD-10-CM

## 2022-10-31 DIAGNOSIS — R52 PAIN: ICD-10-CM

## 2022-10-31 DIAGNOSIS — R09.89 AIR HUNGER: ICD-10-CM

## 2022-10-31 RX ORDER — MORPHINE SULFATE 100 MG/5ML
15 SOLUTION ORAL
Qty: 30 ML | Refills: 0 | Status: SHIPPED | OUTPATIENT
Start: 2022-10-31 | End: 2022-11-02 | Stop reason: SDUPTHER

## 2022-11-02 DIAGNOSIS — Z51.5 HOSPICE CARE PATIENT: ICD-10-CM

## 2022-11-02 DIAGNOSIS — R52 PAIN: ICD-10-CM

## 2022-11-02 DIAGNOSIS — R09.89 AIR HUNGER: ICD-10-CM

## 2022-11-02 RX ORDER — MORPHINE SULFATE 100 MG/5ML
15 SOLUTION ORAL
Qty: 30 ML | Refills: 0 | Status: SHIPPED | OUTPATIENT
Start: 2022-11-02 | End: 2022-11-10 | Stop reason: SDUPTHER

## 2022-11-10 DIAGNOSIS — Z51.5 HOSPICE CARE PATIENT: ICD-10-CM

## 2022-11-10 DIAGNOSIS — R52 PAIN: ICD-10-CM

## 2022-11-10 DIAGNOSIS — R09.89 AIR HUNGER: ICD-10-CM

## 2022-11-10 RX ORDER — MORPHINE SULFATE 100 MG/5ML
15 SOLUTION ORAL
Qty: 30 ML | Refills: 0 | Status: SHIPPED | OUTPATIENT
Start: 2022-11-10 | End: 2022-11-22 | Stop reason: SDUPTHER

## 2022-11-15 DIAGNOSIS — G89.3 NEOPLASM RELATED PAIN: ICD-10-CM

## 2022-11-15 DIAGNOSIS — Z51.5 HOSPICE CARE PATIENT: ICD-10-CM

## 2022-11-15 RX ORDER — METHADONE HYDROCHLORIDE 5 MG/1
5 TABLET ORAL 2 TIMES DAILY
Qty: 60 TABLET | Refills: 0 | Status: SHIPPED
Start: 2022-11-15 | End: 2022-11-17

## 2022-11-17 DIAGNOSIS — G89.3 NEOPLASM RELATED PAIN: ICD-10-CM

## 2022-11-17 DIAGNOSIS — Z51.5 HOSPICE CARE PATIENT: ICD-10-CM

## 2022-11-17 LAB
AMORPHOUS: ABNORMAL
BACTERIA: ABNORMAL /HPF
BILIRUBIN URINE: ABNORMAL
BLOOD, URINE: ABNORMAL
CLARITY: ABNORMAL
COLOR: ABNORMAL
EPITHELIAL CELLS, UA: ABNORMAL /HPF
GLUCOSE URINE: 250 MG/DL
KETONES, URINE: 15 MG/DL
LEUKOCYTE ESTERASE, URINE: ABNORMAL
NITRITE, URINE: POSITIVE
PH UA: 6.5 (ref 5–9)
PROTEIN UA: >=300 MG/DL
RBC UA: >20 /HPF (ref 0–2)
SPECIFIC GRAVITY UA: 1.02 (ref 1–1.03)
UROBILINOGEN, URINE: >=8 E.U./DL
WBC UA: ABNORMAL /HPF (ref 0–5)

## 2022-11-17 RX ORDER — METHADONE HYDROCHLORIDE 5 MG/1
5 TABLET ORAL 3 TIMES DAILY
Qty: 90 TABLET | Refills: 0 | Status: SHIPPED
Start: 2022-11-17 | End: 2022-11-23 | Stop reason: SDUPTHER

## 2022-11-19 LAB
ORGANISM: ABNORMAL
URINE CULTURE, ROUTINE: ABNORMAL

## 2022-11-22 DIAGNOSIS — Z51.5 HOSPICE CARE PATIENT: ICD-10-CM

## 2022-11-22 DIAGNOSIS — R09.89 AIR HUNGER: ICD-10-CM

## 2022-11-22 DIAGNOSIS — R52 PAIN: ICD-10-CM

## 2022-11-22 RX ORDER — MORPHINE SULFATE 100 MG/5ML
15 SOLUTION ORAL
Qty: 30 ML | Refills: 0 | Status: SHIPPED | OUTPATIENT
Start: 2022-11-22 | End: 2022-12-22

## 2022-11-23 DIAGNOSIS — G89.3 NEOPLASM RELATED PAIN: ICD-10-CM

## 2022-11-23 DIAGNOSIS — Z51.5 HOSPICE CARE PATIENT: ICD-10-CM

## 2022-11-23 RX ORDER — METHADONE HYDROCHLORIDE 5 MG/1
TABLET ORAL
Qty: 105 TABLET | Refills: 0 | Status: SHIPPED | OUTPATIENT
Start: 2022-11-23 | End: 2022-12-23

## 2022-12-05 DIAGNOSIS — R09.89 AIR HUNGER: ICD-10-CM

## 2022-12-05 DIAGNOSIS — Z51.5 HOSPICE CARE PATIENT: ICD-10-CM

## 2022-12-05 DIAGNOSIS — R52 PAIN: ICD-10-CM

## 2022-12-05 RX ORDER — MORPHINE SULFATE 100 MG/5ML
15 SOLUTION ORAL
Qty: 30 ML | Refills: 0 | Status: SHIPPED | OUTPATIENT
Start: 2022-12-05 | End: 2023-01-04

## 2022-12-07 DIAGNOSIS — Z09 FOLLOW-UP EXAM: Primary | ICD-10-CM

## 2022-12-14 DIAGNOSIS — Z51.5 HOSPICE CARE PATIENT: ICD-10-CM

## 2022-12-14 DIAGNOSIS — R09.89 AIR HUNGER: ICD-10-CM

## 2022-12-14 DIAGNOSIS — R52 PAIN: ICD-10-CM

## 2022-12-14 RX ORDER — MORPHINE SULFATE 100 MG/5ML
15 SOLUTION ORAL
Qty: 30 ML | Refills: 0 | Status: SHIPPED | OUTPATIENT
Start: 2022-12-14 | End: 2023-01-13

## 2022-12-28 DIAGNOSIS — Z51.5 HOSPICE CARE PATIENT: ICD-10-CM

## 2022-12-28 DIAGNOSIS — R52 PAIN: ICD-10-CM

## 2022-12-28 DIAGNOSIS — R09.89 AIR HUNGER: ICD-10-CM

## 2022-12-28 RX ORDER — MORPHINE SULFATE 100 MG/5ML
15 SOLUTION ORAL
Qty: 30 ML | Refills: 0 | Status: SHIPPED | OUTPATIENT
Start: 2022-12-28 | End: 2022-12-30 | Stop reason: SDUPTHER

## 2022-12-30 DIAGNOSIS — R09.89 AIR HUNGER: ICD-10-CM

## 2022-12-30 DIAGNOSIS — Z51.5 HOSPICE CARE PATIENT: ICD-10-CM

## 2022-12-30 DIAGNOSIS — R52 PAIN: ICD-10-CM

## 2022-12-30 RX ORDER — MORPHINE SULFATE 100 MG/5ML
15 SOLUTION ORAL
Qty: 30 ML | Refills: 0 | Status: SHIPPED | OUTPATIENT
Start: 2022-12-30 | End: 2023-01-29